# Patient Record
Sex: MALE | Race: WHITE | HISPANIC OR LATINO
[De-identification: names, ages, dates, MRNs, and addresses within clinical notes are randomized per-mention and may not be internally consistent; named-entity substitution may affect disease eponyms.]

---

## 2017-11-21 ENCOUNTER — APPOINTMENT (OUTPATIENT)
Dept: VASCULAR SURGERY | Facility: CLINIC | Age: 72
End: 2017-11-21
Payer: COMMERCIAL

## 2017-11-21 VITALS
HEIGHT: 68 IN | WEIGHT: 180 LBS | HEART RATE: 70 BPM | DIASTOLIC BLOOD PRESSURE: 82 MMHG | SYSTOLIC BLOOD PRESSURE: 136 MMHG | BODY MASS INDEX: 27.28 KG/M2 | TEMPERATURE: 98.1 F

## 2017-11-21 PROCEDURE — 99213 OFFICE O/P EST LOW 20 MIN: CPT

## 2017-11-28 ENCOUNTER — APPOINTMENT (OUTPATIENT)
Dept: GASTROENTEROLOGY | Facility: CLINIC | Age: 72
End: 2017-11-28
Payer: COMMERCIAL

## 2017-11-28 VITALS
SYSTOLIC BLOOD PRESSURE: 114 MMHG | TEMPERATURE: 98.1 F | OXYGEN SATURATION: 98 % | HEIGHT: 68 IN | RESPIRATION RATE: 16 BRPM | DIASTOLIC BLOOD PRESSURE: 72 MMHG | BODY MASS INDEX: 27.89 KG/M2 | HEART RATE: 73 BPM | WEIGHT: 184 LBS

## 2017-11-28 DIAGNOSIS — Z86.010 PERSONAL HISTORY OF COLONIC POLYPS: ICD-10-CM

## 2017-11-28 PROCEDURE — 99203 OFFICE O/P NEW LOW 30 MIN: CPT

## 2017-12-21 ENCOUNTER — OUTPATIENT (OUTPATIENT)
Dept: OUTPATIENT SERVICES | Facility: HOSPITAL | Age: 72
LOS: 1 days | Discharge: HOME | End: 2017-12-21
Payer: COMMERCIAL

## 2017-12-21 ENCOUNTER — APPOINTMENT (OUTPATIENT)
Dept: GASTROENTEROLOGY | Facility: HOSPITAL | Age: 72
End: 2017-12-21

## 2017-12-21 ENCOUNTER — RESULT REVIEW (OUTPATIENT)
Age: 72
End: 2017-12-21

## 2017-12-21 DIAGNOSIS — Z12.11 ENCOUNTER FOR SCREENING FOR MALIGNANT NEOPLASM OF COLON: ICD-10-CM

## 2017-12-21 PROCEDURE — 45380 COLONOSCOPY AND BIOPSY: CPT | Mod: 59,33,GC

## 2017-12-21 PROCEDURE — 88305 TISSUE EXAM BY PATHOLOGIST: CPT | Mod: 26

## 2017-12-21 PROCEDURE — 45385 COLONOSCOPY W/LESION REMOVAL: CPT | Mod: 33,GC

## 2017-12-22 LAB — SURGICAL PATHOLOGY STUDY: SIGNIFICANT CHANGE UP

## 2018-11-08 ENCOUNTER — APPOINTMENT (OUTPATIENT)
Dept: OPHTHALMOLOGY | Facility: CLINIC | Age: 73
End: 2018-11-08
Payer: MEDICARE

## 2018-11-08 DIAGNOSIS — H26.9 UNSPECIFIED CATARACT: ICD-10-CM

## 2018-11-08 DIAGNOSIS — H02.89 OTHER SPECIFIED DISORDERS OF EYELID: ICD-10-CM

## 2018-11-08 PROCEDURE — 92004 COMPRE OPH EXAM NEW PT 1/>: CPT

## 2018-11-08 RX ORDER — ASPIRIN 325 MG/1
325 TABLET, FILM COATED ORAL
Refills: 0 | Status: ACTIVE | COMMUNITY

## 2018-11-08 RX ORDER — POLYETHYLENE GLYCOL 3350, SODIUM SULFATE, SODIUM CHLORIDE, POTASSIUM CHLORIDE, ASCORBIC ACID, SODIUM ASCORBATE 7.5-2.691G
100 KIT ORAL
Qty: 1 | Refills: 0 | Status: COMPLETED | COMMUNITY
Start: 2017-11-28 | End: 2018-11-08

## 2018-11-26 PROBLEM — H26.9 INCIPIENT CATARACT: Status: ACTIVE | Noted: 2018-11-26

## 2018-11-26 PROBLEM — H02.89 MEIBOMIAN GLAND DYSFUNCTION (MGD): Status: ACTIVE | Noted: 2018-11-26

## 2021-09-15 ENCOUNTER — APPOINTMENT (OUTPATIENT)
Dept: VASCULAR SURGERY | Facility: CLINIC | Age: 76
End: 2021-09-15
Payer: COMMERCIAL

## 2021-09-15 VITALS
DIASTOLIC BLOOD PRESSURE: 80 MMHG | TEMPERATURE: 93.3 F | HEART RATE: 73 BPM | SYSTOLIC BLOOD PRESSURE: 118 MMHG | HEIGHT: 68 IN | WEIGHT: 185 LBS | BODY MASS INDEX: 28.04 KG/M2

## 2021-09-15 DIAGNOSIS — I72.8 ANEURYSM OF OTHER SPECIFIED ARTERIES: ICD-10-CM

## 2021-09-15 PROCEDURE — 93922 UPR/L XTREMITY ART 2 LEVELS: CPT

## 2021-09-15 PROCEDURE — 99203 OFFICE O/P NEW LOW 30 MIN: CPT

## 2021-09-15 PROCEDURE — 93976 VASCULAR STUDY: CPT

## 2021-09-15 PROCEDURE — XXXXX: CPT | Mod: 1L

## 2022-04-15 ENCOUNTER — APPOINTMENT (OUTPATIENT)
Dept: GASTROENTEROLOGY | Facility: CLINIC | Age: 77
End: 2022-04-15
Payer: COMMERCIAL

## 2022-04-15 VITALS
HEIGHT: 68 IN | DIASTOLIC BLOOD PRESSURE: 78 MMHG | SYSTOLIC BLOOD PRESSURE: 125 MMHG | OXYGEN SATURATION: 97 % | TEMPERATURE: 97.8 F | BODY MASS INDEX: 28.19 KG/M2 | HEART RATE: 66 BPM | WEIGHT: 186 LBS

## 2022-04-15 DIAGNOSIS — E66.3 OVERWEIGHT: ICD-10-CM

## 2022-04-15 DIAGNOSIS — Z12.11 ENCOUNTER FOR SCREENING FOR MALIGNANT NEOPLASM OF COLON: ICD-10-CM

## 2022-04-15 PROCEDURE — 99203 OFFICE O/P NEW LOW 30 MIN: CPT

## 2022-04-15 RX ORDER — PEG-3350, SODIUM SULFATE, SODIUM CHLORIDE, POTASSIUM CHLORIDE, SODIUM ASCORBATE AND ASCORBIC ACID 7.5-2.691G
100 KIT ORAL
Qty: 1 | Refills: 0 | Status: ACTIVE | COMMUNITY
Start: 2022-04-15 | End: 1900-01-01

## 2022-04-15 NOTE — PHYSICAL EXAM
[General Appearance - Alert] : alert [General Appearance - In No Acute Distress] : in no acute distress [General Appearance - Well Nourished] : well nourished [General Appearance - Well Developed] : well developed [General Appearance - Well-Appearing] : healthy appearing [Sclera] : the sclera and conjunctiva were normal [Neck Appearance] : the appearance of the neck was normal [Neck Cervical Mass (___cm)] : no neck mass was observed [Respiration, Rhythm And Depth] : normal respiratory rhythm and effort [Exaggerated Use Of Accessory Muscles For Inspiration] : no accessory muscle use [Auscultation Breath Sounds / Voice Sounds] : lungs were clear to auscultation bilaterally [Heart Rate And Rhythm] : heart rate was normal and rhythm regular [Heart Sounds] : normal S1 and S2 [Heart Sounds Gallop] : no gallops [Murmurs] : no murmurs [Heart Sounds Pericardial Friction Rub] : no pericardial rub [Edema] : there was no peripheral edema [Abdomen Soft] : soft [Abdomen Tenderness] : non-tender [] : no hepato-splenomegaly [Abdomen Mass (___ Cm)] : no abdominal mass palpated [Abdomen Hernia] : no hernia was discovered [Cervical Lymph Nodes Enlarged Posterior Bilaterally] : posterior cervical [Cervical Lymph Nodes Enlarged Anterior Bilaterally] : anterior cervical [Supraclavicular Lymph Nodes Enlarged Bilaterally] : supraclavicular [Abnormal Walk] : normal gait [Nail Clubbing] : no clubbing  or cyanosis of the fingernails [Skin Color & Pigmentation] : normal skin color and pigmentation [Oriented To Time, Place, And Person] : oriented to person, place, and time [Impaired Insight] : insight and judgment were intact [Affect] : the affect was normal [Mood] : the mood was normal [FreeTextEntry1] : Healed right lower quadrant appendectomy scar is noted.

## 2022-04-15 NOTE — REASON FOR VISIT
[Consultation] : a consultation visit [FreeTextEntry1] : in preparation for a surveillance colonoscopy and evaluation of diarrhea.

## 2022-04-15 NOTE — ASSESSMENT
[FreeTextEntry1] : Impression:\par \par #1 history of colonic adenomatous polyps-status post removal of 5 diminutive adenomas at the 3/22/13 colonoscopy.  Colonoscopy on 12/21/2017 noted for removal of a diminutive 3 mm ileocecal valve adenoma. Rule out metachronous lesions.\par \par #2 diarrhea–chronic and episodic.  Watery diarrhea with associated some borborygmi and gas but otherwise no abdominal pain, fever, weight loss, rectal bleeding etc.  Seems to be slowly recovering.  Mostly having normal bowel movements with episodes of self-limited diarrhea every 1 to 2 weeks.  Possibly a foodborne etiology triggering a watery osmotic mediated diarrhea although specific dietary culprit not implicated.  So far infectious etiology ruled out.\par \par #3 status post appendectomy.\par \par #4 overweight–BMI 28.28.  Of note, borderline elevated ALT noted of 43.  Possible component of hepatic steatosis.\par \par

## 2022-04-15 NOTE — CONSULT LETTER
[Dear  ___] : Dear  [unfilled], [Consult Letter:] : I had the pleasure of evaluating your patient, [unfilled]. [Please see my note below.] : Please see my note below. [Consult Closing:] : Thank you very much for allowing me to participate in the care of this patient.  If you have any questions, please do not hesitate to contact me. [Sincerely,] : Sincerely, [( Thank you for referring [unfilled] for consultation for _____ )] : Thank you for referring [unfilled] for consultation for [unfilled] [FreeTextEntry2] : Dr. David Shabazz [FreeTextEntry3] : Joce Kelley M.D.

## 2022-04-15 NOTE — HISTORY OF PRESENT ILLNESS
[FreeTextEntry1] : Office consultation on 4/15/2022.\par \par Patient is a 77-year-old man evaluated for office consultation in preparation for a surveillance colonoscopy and evaluation of diarrhea. PMD: Dr. David Shabazz.\par \par The patient has been experiencing intermittent episodic diarrhea of approximately 6 months duration.  Onset temporally related to travel to [place] where he is from.  Not uncommonly gets diarrhea when back in [place] that he attributes to "the water".  In the past this has always been self-limited.  Since return home over the past 6 months he has been experiencing intermittent episodic diarrhea.  This has been gradually improving.  In the beginning episodes were approximately twice per week.  Now episodes occur every 1 to 2 weeks.  Prior normal bowel habit described as 1 formed [place] 4 bowel movement daily.  Of note, during the days (currently 1 to 2 weeks) in between episodes he has passage of otherwise normal formed [place] 4 bowel movements once daily.\par \par Episodes of diarrhea occur sporadically with associated passage of a single watery nonbloody diarrheal stool.  Typically one loose stool is passed and then he is okay for the remainder of the day.  Might have some associated borborygmi and gas but otherwise indicates no associated abdominal pain.\par \par Otherwise feels well.  Symptoms persist despite avoiding sugar, milk and lactose.  Thinks wine may be provocative but not sure.  No other precipitating factors reported.  As noted, symptoms gradually subsiding.\par \par Underwent evaluation by PMD:\par -Stool Giardia negative.\par -Stool ova and parasites negative.\par -Cryptosporidium negative.\par -C. difficile negative.\par -Stool culture negative.\par -TTG IgA <0.5.\par -Serum IgA 56 which is mildly low.\par -TSH 0.776.\par -CBC: WBC 6700, hemoglobin 16.2, hematocrit 47.1, platelet count 238,000.\par CMP: Creatinine 0.98.  Total protein 6.2, albumin 4.6, bilirubin 1.5, alkaline phosphatase 51, AST 25, ALT 42.\par -TTG IgG <0.8.\par \par Otherwise feels well.  As noted, during the time interval of several weeks between episodes he has 1 normal formed [place] 4 bowel movement daily.  Denies fever.  Appetite and weight are stable.  Denies any complaints of dysphagia, heartburn, nausea, vomiting or abdominal pain.\par \par Colonoscopy on 7/23/08 was noted for removal of a diminutive colonic adenoma.\par \par Colonoscopy on 3/22/13 was noted for removal of 5 diminutive adenomas. Hemorrhoids were detected.\par \par Colonoscopy on 12/21/2017 was noted for removal of a 3 mm ileocecal valve adenoma, 3 mm nonneoplastic hepatic flexure polyp and 6 mm sessile transverse colon hyperplastic polyp.\par \par Patient is status post appendectomy. Patient reports no other history of digestive illness. Denies family history of colon cancer.

## 2022-05-07 LAB — GI PCR PANEL, STOOL: ABNORMAL

## 2022-06-01 DIAGNOSIS — Z01.818 ENCOUNTER FOR OTHER PREPROCEDURAL EXAMINATION: ICD-10-CM

## 2022-06-07 ENCOUNTER — APPOINTMENT (OUTPATIENT)
Dept: GASTROENTEROLOGY | Facility: HOSPITAL | Age: 77
End: 2022-06-07

## 2022-07-05 DIAGNOSIS — A04.1 ENTEROTOXIGENIC ESCHERICHIA COLI INFECTION: ICD-10-CM

## 2022-07-05 LAB
BACTERIA STL CULT: NORMAL
C DIFF TOX GENS STL QL NAA+PROBE: NORMAL
CDIFF BY PCR: NOT DETECTED
FAT STL QN: NORMAL
FAT STL QN: NORMAL
GI PCR PANEL, STOOL: ABNORMAL

## 2022-07-06 LAB — CALPROTECTIN FECAL: 435 UG/G

## 2022-07-08 LAB — PANCREATIC ELASTASE, FECAL: 74

## 2022-07-26 ENCOUNTER — OUTPATIENT (OUTPATIENT)
Dept: OUTPATIENT SERVICES | Facility: HOSPITAL | Age: 77
LOS: 1 days | Discharge: HOME | End: 2022-07-26

## 2022-07-26 ENCOUNTER — RESULT REVIEW (OUTPATIENT)
Age: 77
End: 2022-07-26

## 2022-07-26 ENCOUNTER — APPOINTMENT (OUTPATIENT)
Dept: GASTROENTEROLOGY | Facility: HOSPITAL | Age: 77
End: 2022-07-26

## 2022-07-26 VITALS
RESPIRATION RATE: 17 BRPM | OXYGEN SATURATION: 100 % | HEART RATE: 77 BPM | TEMPERATURE: 98 F | DIASTOLIC BLOOD PRESSURE: 66 MMHG | SYSTOLIC BLOOD PRESSURE: 128 MMHG | HEIGHT: 68 IN | WEIGHT: 182.1 LBS

## 2022-07-26 VITALS
SYSTOLIC BLOOD PRESSURE: 121 MMHG | DIASTOLIC BLOOD PRESSURE: 48 MMHG | OXYGEN SATURATION: 98 % | HEART RATE: 69 BPM | RESPIRATION RATE: 15 BRPM

## 2022-07-26 DIAGNOSIS — Z12.11 ENCOUNTER FOR SCREENING FOR MALIGNANT NEOPLASM OF COLON: ICD-10-CM

## 2022-07-26 PROCEDURE — 88305 TISSUE EXAM BY PATHOLOGIST: CPT | Mod: 26

## 2022-07-26 PROCEDURE — 45380 COLONOSCOPY AND BIOPSY: CPT

## 2022-07-26 NOTE — ASU PATIENT PROFILE, ADULT - FALL HARM RISK - UNIVERSAL INTERVENTIONS
Bed in lowest position, wheels locked, appropriate side rails in place/Call bell, personal items and telephone in reach/Instruct patient to call for assistance before getting out of bed or chair/Non-slip footwear when patient is out of bed/[place] to call system/Physically safe environment - no spills, clutter or unnecessary equipment/Purposeful Proactive Rounding/Room/bathroom lighting operational, light cord in reach

## 2022-08-03 ENCOUNTER — NON-APPOINTMENT (OUTPATIENT)
Age: 77
End: 2022-08-03

## 2022-08-23 ENCOUNTER — NON-APPOINTMENT (OUTPATIENT)
Age: 77
End: 2022-08-23

## 2022-08-23 RX ORDER — AZITHROMYCIN 500 MG/1
500 TABLET, FILM COATED ORAL DAILY
Qty: 3 | Refills: 0 | Status: ACTIVE | COMMUNITY
Start: 2022-08-23 | End: 1900-01-01

## 2022-09-21 ENCOUNTER — APPOINTMENT (OUTPATIENT)
Dept: VASCULAR SURGERY | Facility: CLINIC | Age: 77
End: 2022-09-21

## 2022-11-25 NOTE — ASU PATIENT PROFILE, ADULT - FALL HARM RISK - FACTORS NURSING JUDGEMENT
Disp Refills Start End     sulfaSALAzine (AZULFIDINE) 500 MG tablet 360 tablet 0 8/30/2022     Sig: TAKE 2 TABLETS BY MOUTH TWICE DAILY WITH MEALS    Sent to pharmacy as: sulfaSALAzine 500 MG Oral Tablet (AZULFIDINE)    Class: Eprescribe    E-Prescribing Status: Receipt confirmed by pharmacy (8/30/2022  3:31 PM CDT)      Ok to refill per protocol  Last office:  5.24.22  Next visit:     12.2.22  Last labs:      8.12.22     No

## 2024-02-11 ENCOUNTER — NON-APPOINTMENT (OUTPATIENT)
Age: 79
End: 2024-02-11

## 2024-02-15 ENCOUNTER — NON-APPOINTMENT (OUTPATIENT)
Age: 79
End: 2024-02-15

## 2024-02-19 ENCOUNTER — NON-APPOINTMENT (OUTPATIENT)
Age: 79
End: 2024-02-19

## 2024-02-21 LAB
BACTERIA STL CULT: NORMAL
CALPROTECTIN FECAL: 223 UG/G
CDIFF BY PCR: NOT DETECTED
ENTEROPATHOGENIC E. COLI (EPEC): DETECTED
ENTEROTOXIGENIC E.COLI (ETEC) LT/ST: DETECTED
GI PCR PANEL: DETECTED
PANCREATIC ELASTASE, FECAL: <50 CD:794062645

## 2024-02-21 RX ORDER — AZITHROMYCIN 500 MG/1
500 TABLET, FILM COATED ORAL DAILY
Qty: 3 | Refills: 0 | Status: ACTIVE | COMMUNITY
Start: 2022-07-05 | End: 1900-01-01

## 2024-02-26 ENCOUNTER — RESULT REVIEW (OUTPATIENT)
Age: 79
End: 2024-02-26

## 2024-02-27 ENCOUNTER — OUTPATIENT (OUTPATIENT)
Dept: OUTPATIENT SERVICES | Facility: HOSPITAL | Age: 79
LOS: 1 days | End: 2024-02-27
Payer: COMMERCIAL

## 2024-02-27 ENCOUNTER — APPOINTMENT (OUTPATIENT)
Dept: CT IMAGING | Facility: IMAGING CENTER | Age: 79
End: 2024-02-27

## 2024-02-27 ENCOUNTER — APPOINTMENT (OUTPATIENT)
Dept: CT IMAGING | Facility: IMAGING CENTER | Age: 79
End: 2024-02-27
Payer: COMMERCIAL

## 2024-02-27 DIAGNOSIS — K52.9 NONINFECTIVE GASTROENTERITIS AND COLITIS, UNSPECIFIED: ICD-10-CM

## 2024-02-27 PROCEDURE — 74177 CT ABD & PELVIS W/CONTRAST: CPT

## 2024-02-27 PROCEDURE — 74177 CT ABD & PELVIS W/CONTRAST: CPT | Mod: 26

## 2024-03-08 ENCOUNTER — NON-APPOINTMENT (OUTPATIENT)
Age: 79
End: 2024-03-08

## 2024-03-11 ENCOUNTER — APPOINTMENT (OUTPATIENT)
Dept: GASTROENTEROLOGY | Facility: CLINIC | Age: 79
End: 2024-03-11
Payer: COMMERCIAL

## 2024-03-11 VITALS
DIASTOLIC BLOOD PRESSURE: 79 MMHG | SYSTOLIC BLOOD PRESSURE: 126 MMHG | HEIGHT: 68 IN | WEIGHT: 180 LBS | HEART RATE: 73 BPM | BODY MASS INDEX: 27.28 KG/M2 | OXYGEN SATURATION: 98 %

## 2024-03-11 DIAGNOSIS — K52.9 NONINFECTIVE GASTROENTERITIS AND COLITIS, UNSPECIFIED: ICD-10-CM

## 2024-03-11 DIAGNOSIS — A09 INFECTIOUS GASTROENTERITIS AND COLITIS, UNSPECIFIED: ICD-10-CM

## 2024-03-11 PROCEDURE — 99214 OFFICE O/P EST MOD 30 MIN: CPT

## 2024-03-11 NOTE — ADDENDUM
[FreeTextEntry1] : Plan:  #1 chronic diarrhea viewed including the possible etiology, diagnostic measures and treatments.  #2 follow-up stool studies-GI PCR, fecal calprotectin and fecal elastase.  #3 lactose-free diet.  Avoid dietary culprits that could exacerbate diarrhea.  #4 pending above evaluation will assess for bile acid diarrhea.  Pending clinical course consider empiric trial of cholestyramine or colestipol.  #5 patient reports recent routine labs submitted by PMD.  Reports requested for review.  #6 surveillance colonoscopy advised for 7/2027.

## 2024-03-11 NOTE — HISTORY OF PRESENT ILLNESS
[FreeTextEntry1] : Office revisit on 3/11/2024.  Evaluated for follow-up regarding intermittent diarrhea.  Previously evaluated office consultation on 4/15/2022.  CONSULTATION NOTE (4/15/2022):  Patient is a 77-year-old man evaluated for office consultation in preparation for a surveillance colonoscopy and evaluation of diarrhea. PMD: Dr. David Shabazz.  The patient has been experiencing intermittent episodic diarrhea of approximately 6 months duration.  Onset temporally related to travel to [place] where he is from.  Not uncommonly gets diarrhea when back in [place] that he attributes to "the water".  In the past this has always been self-limited.  Since return home over the past 6 months he has been experiencing intermittent episodic diarrhea.  This has been gradually improving.  In the beginning episodes were approximately twice per week.  Now episodes occur every 1 to 2 weeks.  Prior normal bowel habit described as 1 formed [place] 4 bowel movement daily.  Of note, during the days (currently 1 to 2 weeks) in between episodes he has passage of otherwise normal formed [place] 4 bowel movements once daily.  Episodes of diarrhea occur sporadically with associated passage of a single watery nonbloody diarrheal stool.  Typically one loose stool is passed and then he is okay for the remainder of the day.  Might have some associated borborygmi and gas but otherwise indicates no associated abdominal pain.  Otherwise feels well.  Symptoms persist despite avoiding sugar, milk and lactose.  Thinks wine may be provocative but not sure.  No other precipitating factors reported.  As noted, symptoms gradually subsiding.  Underwent evaluation by PMD: -Stool Giardia negative. -Stool ova and parasites negative. -Cryptosporidium negative. -C. difficile negative. -Stool culture negative. -TTG IgA <0.5. -Serum IgA 56 which is mildly low. -TSH 0.776. -CBC: WBC 6700, hemoglobin 16.2, hematocrit 47.1, platelet count 238,000. CMP: Creatinine 0.98.  Total protein 6.2, albumin 4.6, bilirubin 1.5, alkaline phosphatase 51, AST 25, ALT 42. -TTG IgG <0.8.  Otherwise feels well.  As noted, during the time interval of several weeks between episodes he has 1 normal formed [place] 4 bowel movement daily.  Denies fever.  Appetite and weight are stable.  Denies any complaints of dysphagia, heartburn, nausea, vomiting or abdominal pain.  Colonoscopy on 7/23/08 was noted for removal of a diminutive colonic adenoma.  Colonoscopy on 3/22/13 was noted for removal of 5 diminutive adenomas. Hemorrhoids were detected.  Colonoscopy on 12/21/2017 was noted for removal of a 3 mm ileocecal valve adenoma, 3 mm nonneoplastic hepatic flexure polyp and 6 mm sessile transverse colon hyperplastic polyp.  Patient is status post appendectomy. Patient reports no other history of digestive illness. Denies family history of colon cancer.  CURRENT HISTORY:  COLONOSCOPY 7/6/2022:     -Surveillance colonoscopy was performed on 7/6/2022. History of multiple colonic adenomas. Recent history diarrhea. Total colonoscopy was performed to the cecum with ileoscopy. Normal terminal ileum. Diminutive 2 mm proximal ascending colon nonneoplastic polyp proving to be prominent lymphoid aggregate and diminutive 1 mm rectal hyperplastic polyp were removed. Few ascending colon diverticula noted. Hemorrhoids detected. Colonic mucosa normal. No ulcerations or colitis. Ascending colon biopsy noted for lymphoid aggregate and descending colon biopsy normal. No findings indicative of microscopic, collagenous or lymphocytic colitis. Surveillance colonoscopy advised in 5 years.  ORV 3/11/2024:     -Evaluated regarding episodic chronic diarrhea.  Current symptoms longstanding of approximately 4 years duration.  Indicates that ever since experiencing acute onset diarrhea during a trip to [place] in 2021 this has been a problem and "have never been the same since".  Travels back and forth to [place] frequently.  Last trip 12/2024.  Describes symptoms as 1 diarrheal mushy [place] 6 bowel movement daily.  Typically, only 1 loose stool.  Mostly in the morning after coffee but before breakfast.  Then is fine throughout the remainder of the day as well as throughout the night.  Reports no issues of fecal urgency or fecal incontinence.  No associated symptoms reported.  Can observe mucus but never observes blood or oil.  Has not been able to discern any predictable dietary triggers.  This complaint of diarrhea is chronic although is episodic and he may have improvement with normal bowel movements for 1 to 2 weeks only to have this problem of diarrhea recur.  Had suspected that red wine or possibly lactose products including milk and cheese were the culprit but upon stopping them he initially felt better but then had ultimate recurrence of this complaint of diarrhea.                                 -Otherwise feels well.  Describes himself is otherwise being in good health.  Denies fever.  Appetite and weight are stable.  Denies complaints of oral ulcers, dysphagia, heartburn, nausea, vomiting or abdominal pain.  No current abdominal pain but did have an isolated and self-limited episode of left upper quadrant abdominal pain lasting 4 to 5 days that occurred 2 weeks ago after drinking vodka.  Currently resolved.                                -Prior evaluation noted for recurrent (?  Persistent) E. coli infections as detected by GI PCR.  Review of stool studies:                                -- GI PCR 2/14/2024: Enteropathogenic E. coli and enterotoxigenic E. coli detected.  Treated with Zithromax 500 mg daily for 3 days x 2 courses-transient improvement for approximately 10 days but then diarrhea recurred.                                --GI PCR 6/30/2022: Enterotoxigenic E. coli.                               --Stool culture - 6/30/2022 and 2/14/2024.                               --C. difficile PCR - 6/30/2022 and 2/14/2024.                               --Stool qualitative fecal fat 6/30/2022: Normal neutral fats.  Increased total fats.                               --Fecal elastase 74 on 6/30/2022 and <50 on 2/14/2024.                               --Fecal calprotectin 435 on 6/30/2022 and 223 on 2/14/2024.                               --GI PCR 5/5/2022: Enterotoxigenic E. coli.                                --CT scan abdomen 2/27/2024 noted for hepatic steatosis but no focal hepatic lesions.  Gallbladder, CBD and pancreas normal.  Colonic diverticulosis.  No bowel inflammation.  Calcified 1.8 cm proximal splenic artery aneurysm noted.

## 2024-03-11 NOTE — ASSESSMENT
[FreeTextEntry1] : Impression:  #1 chronic diarrhea-episodic.  Mostly 1 loose stool daily.  Possibly multifactorial etiology.  Noted to have recurrent E. coli infections including enterotoxigenic E. coli.  Fecal calprotectin mildly elevated indicating possible inflammatory component of symptoms.  Low fecal elastase noted but possibly falsely low due to assessment of diarrheal stools.  Persistent symptoms since acute diarrheal illness 2021 when in [place] posing question as to postinfectious functional component of symptoms.  No indication of microscopic colitis at recent colonoscopy.  #2 history of colonic adenomatous polyps-status post removal of 5 diminutive adenomas at the 3/22/13 colonoscopy.  Colonoscopy on 12/21/2017 noted for removal of a diminutive 3 mm ileocecal valve adenoma.  Colonoscopy 7/6/2022 without detection of metachronous adenomas.  #3 diverticulosis-colonoscopy 7/6/2022 noted for ascending colon diverticula.  #4 status post appendectomy.  #5 overweight-BMI 27.37.  Component of hepatic steatosis on the 2/27/2024 CT.  #6 splenic artery aneurysm-detection of 1.8 cm calcified proximal splenic artery aneurysm (patient reports followed by PMD).

## 2024-03-11 NOTE — CONSULT LETTER
[Dear  ___] : Dear  [unfilled], [Consult Letter:] : I had the pleasure of evaluating your patient, [unfilled]. [Consult Closing:] : Thank you very much for allowing me to participate in the care of this patient.  If you have any questions, please do not hesitate to contact me. [Please see my note below.] : Please see my note below. [Sincerely,] : Sincerely, [FreeTextEntry2] : Dr. David Shabazz [FreeTextEntry3] : Joce Kelley M.D.

## 2024-03-11 NOTE — PHYSICAL EXAM
[Alert] : alert [Healthy Appearing] : healthy appearing [Normal Voice/Communication] : normal voice/communication [Sclera] : the sclera and conjunctiva were normal [No Acute Distress] : no acute distress [No Acc Muscle Use] : no accessory muscle use [Normal Appearance] : the appearance of the neck was normal [No Respiratory Distress] : no respiratory distress [Respiration, Rhythm And Depth] : normal respiratory rhythm and effort [Auscultation Breath Sounds / Voice Sounds] : lungs were clear to auscultation bilaterally [Heart Rate And Rhythm] : heart rate was normal and rhythm regular [Normal S1, S2] : normal S1 and S2 [Murmurs] : no murmurs [None] : no edema [Abdomen Tenderness] : non-tender [Bowel Sounds] : normal bowel sounds [No Masses] : no abdominal mass palpated [] : no hepatosplenomegaly [Abdomen Soft] : soft [Supraclavicular Lymph Nodes Enlarged Bilaterally] : no supraclavicular lymphadenopathy [Cervical Lymph Nodes Enlarged Posterior Bilaterally] : no posterior cervical lymphadenopathy [Cervical Lymph Nodes Enlarged Anterior Bilaterally] : no anterior cervical lymphadenopathy [Abnormal Walk] : normal gait [No Clubbing, Cyanosis] : no clubbing or cyanosis of the fingernails [Oriented To Time, Place, And Person] : oriented to person, place, and time [Normal Color / Pigmentation] : normal skin color and pigmentation [Normal Affect] : the affect was normal [Normal Insight/Judgment] : insight and judgment were intact [Normal Mood] : the mood was normal

## (undated) DEVICE — TUBING MEDI-VAC W MAXIGRIP CONNECTORS 1/4"X6'

## (undated) DEVICE — BASIN EMESIS 10IN GRADUATED MAUVE

## (undated) DEVICE — CATH IV SAFE BC 22G X 1" (BLUE)

## (undated) DEVICE — GOWN LG

## (undated) DEVICE — FACESHIELD FULL VISOR

## (undated) DEVICE — TUBING IV SET GRAVITY 3Y 100" MACRO

## (undated) DEVICE — LINE BREATHE SAMPLNG

## (undated) DEVICE — PACK IV START WITH CHG

## (undated) DEVICE — SALIVA EJECTOR (BLUE)

## (undated) DEVICE — LUBRICATING JELLY HR ONE SHOT 3G

## (undated) DEVICE — DRSG BANDAID 0.75X3"

## (undated) DEVICE — CONTAINER FORMALIN 10% 20ML

## (undated) DEVICE — ELCTR GROUNDING PAD ADULT COVIDIEN

## (undated) DEVICE — DRSG CURITY GAUZE SPONGE 4 X 4" 12-PLY NON-STERILE

## (undated) DEVICE — BIOPSY FORCEP RADIAL JAW 4 STANDARD WITH NEEDLE

## (undated) DEVICE — TUBING SUCTION NONCONDUCTIVE 6MM X 12FT

## (undated) DEVICE — ELCTR ECG CONDUCTIVE ADHESIVE

## (undated) DEVICE — CONTAINER FORMALIN 80ML YELLOW

## (undated) DEVICE — DRSG 2X2

## (undated) DEVICE — BIOPSY FORCEP COLD DISP